# Patient Record
Sex: FEMALE | Race: WHITE | NOT HISPANIC OR LATINO | Employment: FULL TIME | URBAN - METROPOLITAN AREA
[De-identification: names, ages, dates, MRNs, and addresses within clinical notes are randomized per-mention and may not be internally consistent; named-entity substitution may affect disease eponyms.]

---

## 2024-03-25 DIAGNOSIS — Z12.31 OTHER SCREENING MAMMOGRAM: Primary | ICD-10-CM

## 2024-03-25 PROBLEM — Z80.3 FAMILY HISTORY OF MALIGNANT NEOPLASM OF BREAST: Status: ACTIVE | Noted: 2024-03-25

## 2024-03-25 PROBLEM — Z80.41 FAMILY HISTORY OF MALIGNANT NEOPLASM OF OVARY: Status: ACTIVE | Noted: 2024-03-25

## 2024-03-25 PROBLEM — N60.12 DIFFUSE CYSTIC MASTOPATHY OF LEFT BREAST: Status: ACTIVE | Noted: 2024-03-25

## 2024-03-25 NOTE — PROGRESS NOTES
Dictation #1  MRN:[id number]  CSN:[id number]   [facility]  Roni Kaur MD, FACS  KAYLEE Barahona      Date of Service: 2024      Chief Complaint:   Paula Chavis is a 53 y.o. female presenting today for her annual evaluation.  She is due for a mammogram. She reports no interval changes.     History of Present Illness:   Mrs. Chavis is followed annually. Her exams and imaging have remained stable. Her LUCIANO was calculated in 2019 and put her at the normal population risk. MD::: Milana Campos MD.    Past Medical History:   Diagnosis Date    Diffuse cystic mastopathy of left breast 2024    Diffuse cystic mastopathy, left     Family history of malignant neoplasm of breast 2024    Family history of malignant neoplasm of ovary 2024    Fibroid       Past Surgical History:   Procedure Laterality Date     SECTION      x 2    FOOT SURGERY Right     TRIGGER FINGER RELEASE Right         Current Outpatient Medications:     cetirizine 10 mg TbDL, , Disp: , Rfl:     hydrOXYzine HCL (ATARAX) 25 MG tablet, Take 25 mg by mouth 2 (two) times daily., Disp: , Rfl:     predniSONE (DELTASONE) 10 MG tablet, TAKE 1 TABLET BY MOUTH DAILY FOR 10  DAYS, Disp: , Rfl:     triamcinolone acetonide 0.1% (KENALOG) 0.1 % cream, APPLY 1 APPLICATION EXTERNALLY ONCE A DAY AS NEEDED, Disp: , Rfl:    Review of patient's allergies indicates:   Allergen Reactions    Spider venom Rash      Social History     Tobacco Use    Smoking status: Never    Smokeless tobacco: Never   Substance Use Topics    Alcohol use: Yes     Comment: 1-2 drinks/month      Family History   Problem Relation Age of Onset    Pancreatic cancer Paternal Grandfather     Dementia Paternal Grandmother     Diabetes Paternal Grandmother     Thyroid disease Paternal Grandmother     Angina Maternal Grandmother     Heart disease Maternal Grandfather     Heart attack Maternal Grandfather     Hypertension Father      Hyperlipidemia Mother     Ovarian cancer Mother     Hypertension Mother     Stroke Maternal Aunt     Prostate cancer Paternal Uncle         Review of Systems   Integumentary:  Negative for color change, rash, mole/lesion, breast mass, breast discharge and breast tenderness.   Breast: Negative for mass and tenderness       Physical Exam   HENT:   Head: Normocephalic.   Pulmonary/Chest: Right breast exhibits no inverted nipple, no mass, no nipple discharge, no skin change and no tenderness. Left breast exhibits no inverted nipple, no mass, no nipple discharge, no skin change and no tenderness. No breast swelling.   Genitourinary: No breast swelling.   Musculoskeletal: Lymphadenopathy:      Upper Body:      Right upper body: No supraclavicular or axillary adenopathy.      Left upper body: No supraclavicular or axillary adenopathy.     Neurological: She is alert.        MAMMOGRAM REPORT: She has some diffuse fibronodular tissue; there are no spiculated lesions, distortions or suspicious calcifications noted; NEM    ASSESSMENT and PLAN OF CARE     1. Diffuse cystic mastopathy of left breast  Assessment & Plan:  We discussed our Fibrocystic Mastopathy Protocol in detail. She should take Vitamin E 800 IU everyday x 3 months or until non-tender then can stop Vitamin E vs. continue daily at 400 IU.  The use of ice packs or warms soaks to tender area of the breast may also be of some benefit.  If warm soaks help her tenderness - She can use Aspercreme (unless allergic to Aspirin) on the affected area.  Ibuprofen (if no contraindications) at 800 mg three times per day for 5 days can also relieve many symptoms associated with swollen or inflamed tissue.  She can repeat Ibuprofen for 5 days, but then should be off for 5 days as it may cause gastric upset.  It is a good idea to wear a tight bra during the day and night to minimize movement of the tender area (Sports Bras work well).  Evening Primrose Oil can be bought over the  counter and used at a dose of 3000 mg per day to help with any breast pain/tenderness not improved by implementing the above measures.        2. Family history of malignant neoplasm of breast  Assessment & Plan:  We discussed her family history and how it could impact her own future risks.  We discussed family vs. genetic history and the importance and implications of each. All questions answered to her satisfaction.  She knows that as additional family members are diagnosed - she will need to let us know as this may change follow up and imaging recommendations.    We had a discussion concerning Breast Cancer Risk Reduction and current NCCN Guidelines. She knows that her risk can be lowered slightly with a healthy lifestyle and minimal ETOH use. Being physically active will also help. She should reduce or stay away from OCPs and HRT as possible.         3. Family history of malignant neoplasm of ovary  Assessment & Plan:  Same as above      Medical Decision Making: It is my impression that this patient suffers all conditions contained in this medical document.  Each of these conditions did affect our plan of care and my medical decision making today.  It is my opinion that the medical decision making concerning this patient was of minimal  difficulty based on the aforementioned conditions.  Any further recommendations will be communicated to the patient by me.  I have reviewed and verified her allergies, list of medications, medical and surgical histories, social history, and a pertinent review of symptoms.     Follow up: 1 year and PRN    For: ELDER (S) at [facility]      Addendum 4/4/2024 1044: Mammogram- The breasts have scattered areas of fibroglandular density. There is no evidence of suspicious masses, microcalcifications or architectural distortion. Impression: No mammographic evidence of malignancy.

## 2024-04-01 ENCOUNTER — OFFICE VISIT (OUTPATIENT)
Dept: SURGERY | Facility: CLINIC | Age: 54
End: 2024-04-01
Payer: COMMERCIAL

## 2024-04-01 DIAGNOSIS — N60.12 DIFFUSE CYSTIC MASTOPATHY OF LEFT BREAST: Primary | ICD-10-CM

## 2024-04-01 DIAGNOSIS — Z80.41 FAMILY HISTORY OF MALIGNANT NEOPLASM OF OVARY: ICD-10-CM

## 2024-04-01 DIAGNOSIS — Z80.3 FAMILY HISTORY OF MALIGNANT NEOPLASM OF BREAST: ICD-10-CM

## 2024-04-01 PROCEDURE — 99213 OFFICE O/P EST LOW 20 MIN: CPT | Mod: S$PBB,,, | Performed by: NURSE PRACTITIONER

## 2024-04-01 PROCEDURE — 99213 OFFICE O/P EST LOW 20 MIN: CPT | Mod: PBBFAC,PN | Performed by: NURSE PRACTITIONER

## 2024-04-01 PROCEDURE — 99999 PR PBB SHADOW E&M-EST. PATIENT-LVL III: CPT | Mod: PBBFAC,,, | Performed by: NURSE PRACTITIONER

## 2024-04-01 RX ORDER — MULTIVITAMIN
TABLET ORAL
COMMUNITY

## 2024-04-01 RX ORDER — ASCORBIC ACID 500 MG
TABLET ORAL
COMMUNITY

## 2025-03-24 ENCOUNTER — TELEPHONE (OUTPATIENT)
Dept: SURGERY | Facility: CLINIC | Age: 55
End: 2025-03-24
Payer: COMMERCIAL

## 2025-03-24 NOTE — TELEPHONE ENCOUNTER
Spoke with pt in regards to the cancellation of her upcoming appt with Faye Sandoval. Pt stated she is going to follow up with her GYN. Pt v/u that her appt in now cancelled.